# Patient Record
Sex: MALE | Race: WHITE
[De-identification: names, ages, dates, MRNs, and addresses within clinical notes are randomized per-mention and may not be internally consistent; named-entity substitution may affect disease eponyms.]

---

## 2022-01-11 ENCOUNTER — HOSPITAL ENCOUNTER (OUTPATIENT)
Dept: HOSPITAL 95 - ORSCMMR | Age: 62
Discharge: HOME | End: 2022-01-11
Attending: SURGERY
Payer: COMMERCIAL

## 2022-01-11 VITALS — WEIGHT: 163.36 LBS | BODY MASS INDEX: 32.07 KG/M2 | HEIGHT: 60 IN

## 2022-01-11 DIAGNOSIS — R19.4: ICD-10-CM

## 2022-01-11 DIAGNOSIS — Z87.891: ICD-10-CM

## 2022-01-11 DIAGNOSIS — D12.4: ICD-10-CM

## 2022-01-11 DIAGNOSIS — J45.909: ICD-10-CM

## 2022-01-11 DIAGNOSIS — K57.30: ICD-10-CM

## 2022-01-11 DIAGNOSIS — K62.5: Primary | ICD-10-CM

## 2022-01-11 DIAGNOSIS — R93.5: ICD-10-CM

## 2022-01-11 PROCEDURE — 0DBM8ZX EXCISION OF DESCENDING COLON, VIA NATURAL OR ARTIFICIAL OPENING ENDOSCOPIC, DIAGNOSTIC: ICD-10-PCS | Performed by: SURGERY

## 2022-01-11 NOTE — NUR
01/11/22 1431 Martha Altman
History, Chart, Medications and Allergies reviewed before start of
procedure. Patient confirms NPO status and agrees with scheduled
surgery. 3-LEAD EKG REVIEWED WITH PHYSICIAN PRIOR TO START OF
PROCEDURE. MONITOR INTACT WITH CONTINUOUS PULSE OXIMETRY AND
INTERMITTENT BP. PATIENT DETERMINED TO BE ASA APPROPRIATE FOR
PROPOFOL SEDATION PRIOR TO START OF PROCEDURE BY .

## 2022-01-11 NOTE — NUR
PT ALERT AND ORIENTED, ABLE TO REPOSITION SELF IN BED. REQUESTING PO FOOD AND
FLUIDS, VISITING WITH WIFE AT BEDSIDE.